# Patient Record
Sex: MALE | Race: WHITE | NOT HISPANIC OR LATINO | Employment: OTHER | URBAN - METROPOLITAN AREA
[De-identification: names, ages, dates, MRNs, and addresses within clinical notes are randomized per-mention and may not be internally consistent; named-entity substitution may affect disease eponyms.]

---

## 2022-12-16 ENCOUNTER — HOSPITAL ENCOUNTER (OUTPATIENT)
Dept: CARDIOLOGY | Facility: HOSPITAL | Age: 62
Discharge: HOME | End: 2022-12-16
Admitting: INTERNAL MEDICINE

## 2022-12-16 ENCOUNTER — OFFICE VISIT (OUTPATIENT)
Dept: PULMONOLOGY | Facility: CLINIC | Age: 62
End: 2022-12-16

## 2022-12-16 VITALS
DIASTOLIC BLOOD PRESSURE: 84 MMHG | BODY MASS INDEX: 30.21 KG/M2 | TEMPERATURE: 98 F | HEIGHT: 69 IN | WEIGHT: 204 LBS | OXYGEN SATURATION: 93 % | HEART RATE: 77 BPM | SYSTOLIC BLOOD PRESSURE: 126 MMHG

## 2022-12-16 VITALS — BODY MASS INDEX: 30.21 KG/M2 | HEIGHT: 69 IN | WEIGHT: 204 LBS

## 2022-12-16 DIAGNOSIS — J45.909 ASTHMA, UNSPECIFIED ASTHMA SEVERITY, UNSPECIFIED WHETHER COMPLICATED, UNSPECIFIED WHETHER PERSISTENT: Primary | ICD-10-CM

## 2022-12-16 DIAGNOSIS — R60.0 EDEMA OF LEFT LOWER LEG: ICD-10-CM

## 2022-12-16 DIAGNOSIS — J96.10 CHRONIC RESPIRATORY FAILURE, UNSPECIFIED WHETHER WITH HYPOXIA OR HYPERCAPNIA: ICD-10-CM

## 2022-12-16 DIAGNOSIS — G12.21 ALS (AMYOTROPHIC LATERAL SCLEROSIS): ICD-10-CM

## 2022-12-16 PROBLEM — F32.0 MAJOR DEPRESSIVE DISORDER, SINGLE EPISODE, MILD: Status: ACTIVE | Noted: 2022-08-11

## 2022-12-16 PROBLEM — R63.4 WEIGHT LOSS, UNINTENTIONAL: Status: ACTIVE | Noted: 2022-09-28

## 2022-12-16 LAB
BH CV LOWER VASCULAR LEFT COMMON FEMORAL AUGMENT: NORMAL
BH CV LOWER VASCULAR LEFT COMMON FEMORAL COMPRESS: NORMAL
BH CV LOWER VASCULAR LEFT COMMON FEMORAL PHASIC: NORMAL
BH CV LOWER VASCULAR LEFT COMMON FEMORAL SPONT: NORMAL
BH CV LOWER VASCULAR LEFT DISTAL FEMORAL AUGMENT: NORMAL
BH CV LOWER VASCULAR LEFT DISTAL FEMORAL COMPRESS: NORMAL
BH CV LOWER VASCULAR LEFT DISTAL FEMORAL PHASIC: NORMAL
BH CV LOWER VASCULAR LEFT DISTAL FEMORAL SPONT: NORMAL
BH CV LOWER VASCULAR LEFT GASTRONEMIUS COMPRESS: NORMAL
BH CV LOWER VASCULAR LEFT GREATER SAPH AK COMPRESS: NORMAL
BH CV LOWER VASCULAR LEFT GREATER SAPH BK COMPRESS: NORMAL
BH CV LOWER VASCULAR LEFT LESSER SAPH COMPRESS: NORMAL
BH CV LOWER VASCULAR LEFT MID FEMORAL AUGMENT: NORMAL
BH CV LOWER VASCULAR LEFT MID FEMORAL COMPRESS: NORMAL
BH CV LOWER VASCULAR LEFT MID FEMORAL PHASIC: NORMAL
BH CV LOWER VASCULAR LEFT MID FEMORAL SPONT: NORMAL
BH CV LOWER VASCULAR LEFT PERONEAL COMPRESS: NORMAL
BH CV LOWER VASCULAR LEFT POPLITEAL AUGMENT: NORMAL
BH CV LOWER VASCULAR LEFT POPLITEAL COMPRESS: NORMAL
BH CV LOWER VASCULAR LEFT POPLITEAL PHASIC: NORMAL
BH CV LOWER VASCULAR LEFT POPLITEAL SPONT: NORMAL
BH CV LOWER VASCULAR LEFT POSTERIOR TIBIAL COMPRESS: NORMAL
BH CV LOWER VASCULAR LEFT PROFUNDA FEMORAL AUGMENT: NORMAL
BH CV LOWER VASCULAR LEFT PROFUNDA FEMORAL COMPRESS: NORMAL
BH CV LOWER VASCULAR LEFT PROFUNDA FEMORAL PHASIC: NORMAL
BH CV LOWER VASCULAR LEFT PROFUNDA FEMORAL SPONT: NORMAL
BH CV LOWER VASCULAR LEFT PROXIMAL FEMORAL AUGMENT: NORMAL
BH CV LOWER VASCULAR LEFT PROXIMAL FEMORAL COMPRESS: NORMAL
BH CV LOWER VASCULAR LEFT PROXIMAL FEMORAL PHASIC: NORMAL
BH CV LOWER VASCULAR LEFT PROXIMAL FEMORAL SPONT: NORMAL
BH CV LOWER VASCULAR LEFT SAPHENOFEMORAL JUNCTION AUGMENT: NORMAL
BH CV LOWER VASCULAR LEFT SAPHENOFEMORAL JUNCTION COMPRESS: NORMAL
BH CV LOWER VASCULAR LEFT SAPHENOFEMORAL JUNCTION PHASIC: NORMAL
BH CV LOWER VASCULAR LEFT SAPHENOFEMORAL JUNCTION SPONT: NORMAL
BH CV LOWER VASCULAR RIGHT COMMON FEMORAL AUGMENT: NORMAL
BH CV LOWER VASCULAR RIGHT COMMON FEMORAL COMPRESS: NORMAL
BH CV LOWER VASCULAR RIGHT COMMON FEMORAL PHASIC: NORMAL
BH CV LOWER VASCULAR RIGHT COMMON FEMORAL SPONT: NORMAL
MAXIMAL PREDICTED HEART RATE: 160 BPM
STRESS TARGET HR: 136 BPM

## 2022-12-16 PROCEDURE — 94729 DIFFUSING CAPACITY: CPT | Performed by: INTERNAL MEDICINE

## 2022-12-16 PROCEDURE — 93971 EXTREMITY STUDY: CPT | Performed by: INTERNAL MEDICINE

## 2022-12-16 PROCEDURE — 99204 OFFICE O/P NEW MOD 45 MIN: CPT | Performed by: INTERNAL MEDICINE

## 2022-12-16 PROCEDURE — 94060 EVALUATION OF WHEEZING: CPT | Performed by: INTERNAL MEDICINE

## 2022-12-16 PROCEDURE — 94726 PLETHYSMOGRAPHY LUNG VOLUMES: CPT | Performed by: INTERNAL MEDICINE

## 2022-12-16 PROCEDURE — 93971 EXTREMITY STUDY: CPT

## 2022-12-16 RX ORDER — BACLOFEN 20 MG/1
TABLET ORAL
COMMUNITY
Start: 2022-09-27

## 2022-12-16 RX ORDER — ALBUTEROL SULFATE 90 UG/1
4 AEROSOL, METERED RESPIRATORY (INHALATION) ONCE
Status: COMPLETED | OUTPATIENT
Start: 2022-12-16 | End: 2022-12-16

## 2022-12-16 RX ORDER — FLUTICASONE PROPIONATE AND SALMETEROL 100; 50 UG/1; UG/1
1 POWDER RESPIRATORY (INHALATION)
COMMUNITY
Start: 2022-12-07 | End: 2023-03-10

## 2022-12-16 RX ORDER — MELATONIN: COMMUNITY

## 2022-12-16 RX ORDER — CETIRIZINE HYDROCHLORIDE 10 MG/1
TABLET ORAL
COMMUNITY

## 2022-12-16 RX ORDER — RILUZOLE 50 MG/1
TABLET, FILM COATED ORAL
COMMUNITY
Start: 2022-08-31

## 2022-12-16 RX ORDER — ALBUTEROL SULFATE 90 UG/1
AEROSOL, METERED RESPIRATORY (INHALATION)
COMMUNITY
Start: 2022-12-08

## 2022-12-16 RX ORDER — SERTRALINE HYDROCHLORIDE 100 MG/1
TABLET, FILM COATED ORAL
COMMUNITY
Start: 2022-12-08

## 2022-12-16 RX ADMIN — ALBUTEROL SULFATE 4 PUFF: 90 AEROSOL, METERED RESPIRATORY (INHALATION) at 12:18

## 2022-12-16 NOTE — PROGRESS NOTES
Abhijeet Bradley is a 62 y.o. male.   Chief Complaint   Patient presents with   • Shortness of Breath     F/u        HPI     62 y.o. male seen for the above    He has a history of amyotrophic lateral sclerosis diagnosed 1 year ago.  He follows at  and has been seen by Dr. Hairston in the neurology clinic.  He has been followed every 3 months.  He lives with friends, one of whom is a registered nurse.  She has become increasingly concerned about his respiratory status recently and wanted him evaluated here.    In regards to pulmonary illness, he has a longstanding history of asthma diagnosed around 2008 or 2009 by his best recollection.  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  He said that he would often use Advair when he could get insurance coverage for it but otherwise he would use as needed albuterol.  Recently he has been on the Advair.    He had a respiratory illness after others in the house had the same thing.  He thinks he has been doing worse since then and this was in early November.  He has used his relief inhaler more recently.    For last 6 months he sleeps on his side.  He cannot sleep on his back due to his respiratory status.    He is also concerned about his left lower leg.  This has been an ongoing problem but is somewhat more swollen.  He has a slight amount of cyanosis but pulses are palpable.    The patient's relevant past medical, surgical, family, and social history reviewed and updated in Epic as appropriate.    Current medications are:   Current Outpatient Medications:   •  albuterol sulfate  (90 Base) MCG/ACT inhaler, , Disp: , Rfl:   •  baclofen (LIORESAL) 20 MG tablet, , Disp: , Rfl:   •  cetirizine (zyrTEC) 10 MG tablet, , Disp: , Rfl:   •  cholecalciferol (VITAMIN D3) 25 MCG (1000 UT) tablet, , Disp: , Rfl:   •  Fluticasone-Salmeterol (ADVAIR/WIXELA) 100-50 MCG/ACT DISKUS,  "Inhale 1 puff., Disp: , Rfl:   •  riluzole (RILUTEK) 50 MG tablet, , Disp: , Rfl:   •  sertraline (ZOLOFT) 100 MG tablet, , Disp: , Rfl:   No current facility-administered medications for this visit..    Review of Systems    Review of Systems  ROS documented in patient questionnaire ×14 systems.  Reviewed with patient.  Otherwise negative except as noted in HPI.    Physical Exam    Blood pressure 126/84, pulse 77, temperature 98 °F (36.7 °C), temperature source Infrared, height 175.3 cm (69\"), weight 92.5 kg (204 lb), SpO2 93 %. Body mass index is 30.13 kg/m².    Physical Exam  Vitals and nursing note reviewed.   Constitutional:       Appearance: Normal appearance. He is well-developed.   HENT:      Head: Normocephalic and atraumatic.      Nose: Nose normal.      Mouth/Throat:      Mouth: Mucous membranes are moist.      Pharynx: Oropharynx is clear. No oropharyngeal exudate.   Eyes:      General: No scleral icterus.     Conjunctiva/sclera: Conjunctivae normal.   Neck:      Thyroid: No thyromegaly.      Trachea: No tracheal deviation.   Cardiovascular:      Rate and Rhythm: Normal rate and regular rhythm.      Heart sounds: No murmur heard.    No friction rub. No gallop.   Pulmonary:      Effort: Pulmonary effort is normal. No respiratory distress.      Breath sounds: No wheezing or rales.   Musculoskeletal:         General: No deformity. Normal range of motion.      Comments: Mild cyanosis LLE palpable pulses and mild edema   Skin:     General: Skin is warm and dry.      Findings: No rash.   Neurological:      Mental Status: He is alert and oriented to person, place, and time.   Psychiatric:         Behavior: Behavior normal.         Thought Content: Thought content normal.         DATA:    Reviewed [facility] records from Dr. Hairston dated 9/27/2022    PFT: Moderate restriction.  Probable mild obstruction.  Decreased diffusion.  Vital capacity worse than 9/2022    ASSESSMENT:    Problem List Items Addressed This Visit  "       Pulmonary Problems    Chronic respiratory failure due to ALS       Other    ALS (amyotrophic lateral sclerosis) (HCC)    Relevant Orders    DME NIPPV - IPPV/BiPAP/CPAP   Other Visit Diagnoses     Asthma, unspecified asthma severity, unspecified whether complicated, unspecified whether persistent    -  Primary    Relevant Medications    albuterol sulfate  (90 Base) MCG/ACT inhaler    Fluticasone-Salmeterol (ADVAIR/WIXELA) 100-50 MCG/ACT DISKUS    Edema of left lower leg              62-year-old male with a diagnosis of ALS for the last year.  Over the last 6 months his respiratory status seems to have deteriorated.  He does have a history of asthma and is on Advair and albuterol.  He has to sleep on his side at night as he cannot breathe properly when he is on his back.  He has noted increase inhaler use after a lower respiratory tract infection.    He is noted oxygen saturations in the low 90s frequently.  His PFTs today reveal a vital capacity of 50%.  He feels an increasing lack of energy during the day.  He has no problems with swallowing or aspiration type symptoms.    On an incidental note, he is concerned about his left lower leg.  This is always been a bit more dusky than the right leg but he has had some increasing edema.  Pulses are palpable.  Capillary refill is present but diminished.  He is concerned about the possibility of a blood clot.    PLAN:    1. I think it is time to start him on noninvasive ventilation at night due to his vital capacity 50% and his need for proper positioning to breathe at night.  He is more dyspneic during the day.  2. I did discuss his wishes regarding invasive ventilation and tracheostomy as his disease progresses.  He has not really made any firm decisions on that but will consider his options.  3. Continue Advair  4. Continue as needed albuterol  5. Left lower extremity venous duplex Doppler to rule out DVT  6. He will continue to follow-up in the ALS clinic at   and he tells me they are doing respiratory tests every 3 months so will not repeat that here  7. Continued follow-up here to assist with his respiratory care     I have reviewed the results of my evaluation and impression and discussed my recommendations in detail with the patient and his roommate.    Level of service justified based on 50 minutes spent in patient care on this date of service including, but not limited to: preparing to see the patient, obtaining and/or reviewing history, performing medically appropriate examination, ordering tests/medicine/procedures, independently interpreting results, documenting clinical information in EHR, and counseling/education of patient/family/caregiver.  This is exclusive of time spent on other separate services such as performing procedures or test interpretation, if applicable.  (Level 4 45-59 minutes; Level 5 60-74 minutes)    Signed by  Loco Soliman MD    December 16, 2022      CC: Provider, No Known          Provider, No Known

## 2023-03-10 ENCOUNTER — OFFICE VISIT (OUTPATIENT)
Dept: PULMONOLOGY | Facility: CLINIC | Age: 63
End: 2023-03-10
Payer: MEDICARE

## 2023-03-10 VITALS
HEIGHT: 69 IN | DIASTOLIC BLOOD PRESSURE: 80 MMHG | OXYGEN SATURATION: 95 % | BODY MASS INDEX: 30.13 KG/M2 | SYSTOLIC BLOOD PRESSURE: 122 MMHG | HEART RATE: 78 BPM | TEMPERATURE: 98.1 F

## 2023-03-10 DIAGNOSIS — J45.40 MODERATE PERSISTENT ASTHMA WITHOUT COMPLICATION: ICD-10-CM

## 2023-03-10 DIAGNOSIS — G12.21 ALS (AMYOTROPHIC LATERAL SCLEROSIS): Primary | ICD-10-CM

## 2023-03-10 DIAGNOSIS — J96.10 CHRONIC RESPIRATORY FAILURE, UNSPECIFIED WHETHER WITH HYPOXIA OR HYPERCAPNIA: ICD-10-CM

## 2023-03-10 PROBLEM — R13.12 DYSPHAGIA, OROPHARYNGEAL PHASE: Status: ACTIVE | Noted: 2023-01-06

## 2023-03-10 PROBLEM — J45.909 MODERATE ASTHMA: Status: ACTIVE | Noted: 2023-03-10

## 2023-03-10 PROCEDURE — 99214 OFFICE O/P EST MOD 30 MIN: CPT | Performed by: INTERNAL MEDICINE

## 2023-03-10 RX ORDER — SODIUM PHENYLBUTYRATE/TAURURSODIOL 3; 1 G/1; G/1
1 POWDER, FOR SUSPENSION ORAL DAILY
COMMUNITY
Start: 2023-01-30

## 2023-03-10 RX ORDER — FLUTICASONE PROPIONATE AND SALMETEROL 250; 50 UG/1; UG/1
1 POWDER RESPIRATORY (INHALATION)
Qty: 1 EACH | Refills: 11 | Status: SHIPPED | OUTPATIENT
Start: 2023-03-10

## 2023-03-10 NOTE — PROGRESS NOTES
Subjective:     Chief Complaint:   Chief Complaint   Patient presents with   • Asthma   • Follow-up       HPI:    Abhijeet Bradley is a 62 y.o. male here for follow-up of chronic respiratory failure due to ALS and asthma    He has a history of amyotrophic lateral sclerosis diagnosed in 2021. He follows at  and has been seen by Dr. Hairston in the neurology clinic.  He has been followed every 3 months.  He lives with friends, one of whom is a registered nurse.    She became increasingly concerned about his respiratory status and I saw him in initial consultation in December 2022.     In regards to pulmonary illness, he has a longstanding history of asthma diagnosed around 2008 or 2009 by his best recollection.  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  He said that he would often use Advair when he could get insurance coverage for it but otherwise he would use as needed albuterol.  Recently he has been on the Advair.     At the time of his initial visit with me in December I decided to start him on noninvasive ventilation at night due to his 50% vital capacity, respiratory difficulties, and lack of energy.  He has been using that to good effect.    From the standpoint of his asthma he remains on Wixela and as needed albuterol.    He follows up with the ALS clinic and on his last visit his vital capacity was 40%.    Current medications are:   Current Outpatient Medications:   •  albuterol sulfate  (90 Base) MCG/ACT inhaler, , Disp: , Rfl:   •  baclofen (LIORESAL) 20 MG tablet, , Disp: , Rfl:   •  cetirizine (zyrTEC) 10 MG tablet, , Disp: , Rfl:   •  cholecalciferol (VITAMIN D3) 25 MCG (1000 UT) tablet, , Disp: , Rfl:   •  Phenylbutyrate-Taurursodiol (Relyvrio) 3-1 g pack, Take 1 packet by mouth Daily., Disp: , Rfl:   •  riluzole (RILUTEK) 50 MG tablet, , Disp: , Rfl:   •  sertraline (ZOLOFT) 100 MG tablet, ,  Disp: , Rfl:   •  Fluticasone-Salmeterol (ADVAIR/WIXELA) 100-50 MCG/ACT DISKUS, Inhale 1 puff., Disp: , Rfl: .      The patient's relevant past medical, surgical, family and social history were reviewed and updated in Epic as appropriate.     ROS:    Review of Systems  ROS as documented in patient questionnaire unless as noted otherwise    Objective:    Physical Exam  Vitals reviewed.   Constitutional:       Appearance: He is well-developed.   HENT:      Head: Normocephalic and atraumatic.      Mouth/Throat:      Mouth: Mucous membranes are moist.      Pharynx: Oropharynx is clear.   Neck:      Thyroid: No thyromegaly.   Cardiovascular:      Rate and Rhythm: Normal rate and regular rhythm.      Heart sounds: No murmur heard.    No friction rub. No gallop.   Pulmonary:      Effort: Pulmonary effort is normal. No respiratory distress.      Breath sounds: No wheezing or rales.   Musculoskeletal:      Cervical back: Neck supple.   Skin:     General: Skin is warm and dry.   Neurological:      Mental Status: He is alert and oriented to person, place, and time.   Psychiatric:         Behavior: Behavior normal.         Thought Content: Thought content normal.         Data:    CXR: No additional    PFT: No additional    Assessment:    Problem List Items Addressed This Visit        Pulmonary Problems    Chronic respiratory failure due to ALS    Moderate asthma       Other    ALS (amyotrophic lateral sclerosis) (HCC) - Primary         1. Chronic respiratory failure due to ALS: Nocturnal and as needed noninvasive ventilation with trilogy ventilator ongoing  2. Asthma: Wixela and as needed albuterol.  He has noted increasing symptoms of cough and he he lives in a house where there are dogs to which she is allergic to.  He is only on the 100 dosage    Plan:    1. Increase Wixela to 250 at a dose of 1 inhalation twice daily  2. As needed albuterol  3. Nocturnal trilogy for noninvasive ventilation  4. Continued close  follow-up    Level of Risk Moderate due to: prescription drug management    Discussed in detail with the patient.  He will call prior to his follow up visit for any new problems.    Signed by  Loco Soliman MD